# Patient Record
Sex: MALE | Race: WHITE | Employment: UNEMPLOYED | URBAN - METROPOLITAN AREA
[De-identification: names, ages, dates, MRNs, and addresses within clinical notes are randomized per-mention and may not be internally consistent; named-entity substitution may affect disease eponyms.]

---

## 2019-01-03 ENCOUNTER — HOSPITAL ENCOUNTER (OUTPATIENT)
Dept: PHYSICAL THERAPY | Age: 54
End: 2019-01-03

## 2019-01-10 ENCOUNTER — HOSPITAL ENCOUNTER (OUTPATIENT)
Dept: PHYSICAL THERAPY | Age: 54
End: 2019-01-10

## 2019-01-17 ENCOUNTER — HOSPITAL ENCOUNTER (OUTPATIENT)
Dept: PHYSICAL THERAPY | Age: 54
Discharge: HOME | End: 2019-01-17
Payer: MEDICAID

## 2019-01-17 PROCEDURE — 97110 THERAPEUTIC EXERCISES: CPT

## 2019-01-17 PROCEDURE — 97530 THERAPEUTIC ACTIVITIES: CPT

## 2019-01-17 PROCEDURE — 97163 PT EVAL HIGH COMPLEX 45 MIN: CPT

## 2019-01-17 NOTE — PROGRESS NOTES
[facility]IRA JOVITAVern [facility] 
[address] 
[phone] fax Plan of Care/ Statement of Necessity for Physical Therapy Services Patient name: Roderick Blake Start of Care: 2019 Referral source: Erin Covarrubias : 1965 Medical Diagnosis: Cervicalgia [M54.2] Low back pain [M54.5] Bilateral knee pain [M25.561, M25.562] Payor: [facility] MEDICAID / Plan: [facility] COMMUNITY PLAN [facility] / Product Type: Managed Care Medicaid /  Onset Date:18 Treatment Diagnosis: neck, back, knee pain Prior Hospitalization: see medical history Provider#: [id number] Medications: Verified on Patient summary List  
 Comorbidities: two neck fusions, lumbar fusion, bilateral ankle repairs after fractures, allergies, arthritis, tobacco use, COPD Prior Level of Function: Functionally independent, lives with girlfriend in multilevel home The Plan of Care and following information is based on the information from the initial evaluation. Assessment/ key information: Patient is a pleasant 47year old male who presents with widespread chronic pain following multiple surgeries to his neck, back and feet. He reports that in 2016 he fell and fractured both feet, undergoing surgery bilaterally and being non-weight bearing in a wheelchair for nearly a year, significantly decreasing strength. As he has recovered from these surgeries, he has noticed increased knee strain, Left more than Right. Additionally, he reports neck and back soreness due to fusions. Currently he is sleeping downstairs on a couch due to significant limitations with getting upstairs, and reports intermittent assistance from girlfriend for ADL and to prevent falls due to imbalance.  At evaluation Patient demonstrates the following lumbar AROM: flexion 50%, extension 25% of full, rotation Right 50% Left 75% of full, lateral flexion 75% of full bilaterally. Cervical AROM as follows: flexion 55 deg, extension 42 deg, rotation Right 38 deg Left 45 deg, lateral flexion Right 15 deg Left 25 deg. Overall extremity strength is 5/5 except Left knee 4+/5, and Patient demonstrates compensation and reports pain with resisted hip extension. Back pain with supine bridging. Full knee AROM, limited Left ankle mobility and sensation. Poor dynamic standing balance without UE support. Overall Patient is a good rehab candidate based on motivation, and will benefit from skilled physical therapy in order to address the above deficits. We will initiate in the aquatic setting in order to reduce joint and muscle strain. Evaluation Complexity History HIGH Complexity :3+ comorbidities / personal factors will impact the outcome/ POC ; Examination MEDIUM Complexity : 3 Standardized tests and measures addressing body structure, function, activity limitation and / or participation in recreation  ;Presentation LOW Complexity : Stable, uncomplicated  ;Clinical Decision Making MEDIUM Complexity : FOTO score of 26-74 Overall Complexity Rating: HIGH Problem List: pain affecting function, decrease ROM, decrease strength, edema affecting function, impaired gait/ balance, decrease ADL/ functional abilitiies, decrease activity tolerance, decrease flexibility/ joint mobility and decrease transfer abilities Treatment Plan may include any combination of the following: Therapeutic exercise, Therapeutic activities, Neuromuscular re-education, Physical agent/modality, Gait/balance training, Manual therapy, Aquatic therapy, Patient education, Self Care training, Functional mobility training, Home safety training and Stair training Patient / Family readiness to learn indicated by: asking questions, trying to perform skills and interest 
Persons(s) to be included in education: patient (P) Barriers to Learning/Limitations: None Patient Goal (s): less pain, mobility  
 Patient Self Reported Health Status: poor Rehabilitation Potential: fair Short Term Goals: To be accomplished in 1 weeks: 
Goal: Patient will be independent and compliant with HEP in order to improve muscle strength. Status at last note/certification: issued and reviewed Goal: Patient will initiate aquatic therapy without incident or increase in pain in order to progress toward long term goals. Status at last note/certification: n/a Long Term Goals: To be accomplished in 10 treatments: 
Goal: Patient will improve FOTO assessment score to 44 pts in order to indicate improved functional abilities. Status at last note/certification: 37 pts Goal: Patient will report little to no limitations with climbing one flight of stairs due to knee pain/imbalance in order to improve ease of mobility in the home. Status at last note/certification: limited a lot Goal: Patient will report little to no limitations with carrying light items due to pain/strain in the spine in order to improve ease of assisting girlfriend with household chores and errands. Status at last note/certification: limited a lot Goal: Patient will report ability to ambulate at least half a block with no more than a little limitation in order to improve independence with daily tasks. Status at last note/certification: ambulating no more than 400ft with cane Goal: Patient will improve Left knee strength to 5/5 in order to reduce buckling and improve stability with weight bearing. Status at last note/certification: 4+/5, occasional buckling Goal: Patient will report worst overall pain as 6/10 or less in order to improve quality of life. Status at last note/certification: 32/19 Frequency / Duration: Patient to be seen 2 times per week for 10 treatments. Patient/ Caregiver education and instruction: Diagnosis, prognosis, exercises 
 [x]  Plan of care has been reviewed with RAJANI Bonilla, PT 1/17/2019 8:55 AM 
 _____________________________________________________________________ I certify that the above Therapy Services are being furnished while the patient is under my care. I agree with the treatment plan and certify that this therapy is necessary. [de-identified] Signature:____________Date:_________TIME:________ 
 
[employer], Date and Time must be completed for valid certification ** Please sign and return to [facility] OMAIRA HILL [address] 
[phone] fax

## 2019-01-22 ENCOUNTER — HOSPITAL ENCOUNTER (OUTPATIENT)
Dept: PHYSICAL THERAPY | Age: 54
Discharge: HOME | End: 2019-01-22
Payer: MEDICAID

## 2019-01-22 PROCEDURE — 97113 AQUATIC THERAPY/EXERCISES: CPT

## 2019-01-24 ENCOUNTER — HOSPITAL ENCOUNTER (OUTPATIENT)
Dept: PHYSICAL THERAPY | Age: 54
Discharge: HOME | End: 2019-01-24
Payer: MEDICAID

## 2019-01-24 PROCEDURE — 97113 AQUATIC THERAPY/EXERCISES: CPT

## 2019-01-29 ENCOUNTER — HOSPITAL ENCOUNTER (OUTPATIENT)
Dept: PHYSICAL THERAPY | Age: 54
Discharge: HOME | End: 2019-01-29
Payer: MEDICAID

## 2019-01-29 PROCEDURE — 97113 AQUATIC THERAPY/EXERCISES: CPT

## 2019-01-31 ENCOUNTER — HOSPITAL ENCOUNTER (OUTPATIENT)
Dept: PHYSICAL THERAPY | Age: 54
Discharge: HOME | End: 2019-01-31
Payer: MEDICAID

## 2019-01-31 PROCEDURE — 97113 AQUATIC THERAPY/EXERCISES: CPT

## 2019-02-05 ENCOUNTER — HOSPITAL ENCOUNTER (OUTPATIENT)
Dept: PHYSICAL THERAPY | Age: 54
Discharge: HOME | End: 2019-02-05
Payer: MEDICAID

## 2019-02-05 PROCEDURE — 97113 AQUATIC THERAPY/EXERCISES: CPT

## 2019-02-07 ENCOUNTER — HOSPITAL ENCOUNTER (OUTPATIENT)
Dept: PHYSICAL THERAPY | Age: 54
Discharge: HOME | End: 2019-02-07
Payer: MEDICAID

## 2019-02-07 PROCEDURE — 97113 AQUATIC THERAPY/EXERCISES: CPT

## 2019-02-12 ENCOUNTER — HOSPITAL ENCOUNTER (OUTPATIENT)
Dept: PHYSICAL THERAPY | Age: 54
Discharge: HOME | End: 2019-02-12
Payer: MEDICAID

## 2019-02-12 PROCEDURE — 97113 AQUATIC THERAPY/EXERCISES: CPT

## 2019-02-12 NOTE — PROGRESS NOTES
[facility] OMAIRA JOVITAVern [facility] 
[address], [address] [place] [address] 
[phone] fax Progress Note Patient name: Teddy Schulz Start of Care: 2019 Referral source: Britton Hale* : 1965 Medical Diagnosis: Cervicalgia [M54.2] Low back pain [M54.5] Bilateral knee pain [M25.561, M25.562] Payor: [facility] MEDICAID / Plan: [facility] COMMUNITY PLAN [facility] / Product Type: Managed Care Medicaid /  Onset Date:18 Treatment Diagnosis: neck, back, knee pain Prior Hospitalization: see medical history Provider#: [id number] Medications: Verified on Patient summary List  
 Comorbidities: two neck fusions, lumbar fusion, bilateral ankle repairs after fractures, allergies, arthritis, tobacco use, COPD Prior Level of Function: Functionally independent, lives with girlfriend in [facility] home Visits from Start of Care: 8    Missed Visits: 0 Short Term Goals: To be accomplished in 1 weeks: 
Goal: Patient will be independent and compliant with HEP in order to improve muscle strength. Status at last note/certification: issued and reviewed Current status: met - Pt reports compliance with HEP Goal: Patient will initiate aquatic therapy without incident or increase in pain in order to progress toward long term goals. Status at last note/certification: n/a Current status: met Long Term Goals: To be accomplished in 10 treatments: 
Goal: Patient will improve FOTO assessment score to 44 pts in order to indicate improved functional abilities. Status at last note/certification: 37 pts Current status: not met - FOTO 31 pts (no decline in function noted) Goal: Patient will report little to no limitations with climbing one flight of stairs due to knee pain/imbalance in order to improve ease of mobility in the home. Status at last note/certification: limited a lot Current status: not met - limited a lot in performing; takes stairs sideways ascending and descending Goal: Patient will report little to no limitations with carrying light items due to pain/strain in the spine in order to improve ease of assisting girlfriend with household chores and errands. Status at last note/certification: limited a lot Current status: not met - still limited a lot in lifting and carrying light items Goal: Patient will report ability to ambulate at least half a block with no more than a little limitation in order to improve independence with daily tasks. Status at last note/certification: ambulating no more than 400ft with cane Current status: met - Pt able to walk 1/2 block with little limitation Goal: Patient will improve Left knee strength to 5/5 in order to reduce buckling and improve stability with weight bearing. Status at last note/certification: 4+/5, occasional buckling Current status: progressing - left knee Flex 5/5, knee ext 4+/5 Goal: Patient will report worst overall pain as 6/10 or less in order to improve quality of life. Status at last note/certification: 61/25 Current status: progressing - 7-8/10 pain at worst 
 
Key Functional Changes: Pt is progressing steadily with therapy measures. Pt reports increased flexibility and gains in LE strength. Balance continues to be Pt's main concern, affecting standing and amb tolerance. Pt continues to amb with SPC/walking stick. Pt would benefit from continued skilled PT to further improve LE strength and stability to prepare for trip to the [facility] in April 2019. Updated Goals: to be achieved in 5 weeks: 
Goal: Patient will improve FOTO assessment score to 44 pts in order to indicate improved functional abilities. Status at last note/certification: FOTO 31 pts (no decline in function noted) Current status:  
Goal: Patient will report little to no limitations with climbing one flight of stairs due to knee pain/imbalance in order to improve ease of mobility in the home. Status at last note/certification: limited a lot in performing; takes stairs sideways ascending and descending Current status:  
Goal: Patient will report little to no limitations with carrying light items due to pain/strain in the spine in order to improve ease of assisting girlfriend with household chores and errands. Status at last note/certification: still limited a lot in lifting and carrying light items Current status:  
Goal: Patient will report ability to ambulate at least one block with no more than a little limitation in order to improve independence with daily tasks. Status at last note/certification: Pt able to walk 1/2 block with little limitation Current status:  
Goal: Patient will improve Left knee strength to 5/5 in order to reduce buckling and improve stability with weight bearing. Status at last note/certification: left knee Flex 5/5, knee ext 4+/5 Current status:  
Goal: Patient will report worst overall pain as 6/10 or less in order to improve quality of life. Status at last note/certification: 2-1/35 pain at worst 
Current status:  
 
ASSESSMENT/RECOMMENDATIONS: 
[x]Continue therapy per initial plan/protocol at a frequency of  2 x per week for 5 weeks []Continue therapy with the following recommended changes:_____________________      _____________________________________________________________________ []Discontinue therapy progressing towards or have reached established goals []Discontinue therapy due to lack of appreciable progress towards goals []Discontinue therapy due to lack of attendance or compliance []Await Physician's recommendations/decisions regarding therapy []Other:________________________________________________________________ Thank you for this referral.  
Bronwyn Connors, PT 2/12/2019 9:18 AM 
NOTE TO PHYSICIAN:  PLEASE COMPLETE THE ORDERS BELOW AND FAX TO [facility]: [phone] If you are unable to process this request in 24 hours please contact our office: [phone] []  I have read the above report and request that my patient continue as recommended. []  I have read the above report and request that my patient continue therapy with the following changes/special instructions:________________________________________ []I have read the above report and request that my patient be discharged from therapy.  
 
[de-identified] Signature:____________Date:_________TIME:________ 
 
[employer], Date and Time must be completed for valid certification **

## 2019-02-14 ENCOUNTER — HOSPITAL ENCOUNTER (OUTPATIENT)
Dept: PHYSICAL THERAPY | Age: 54
Discharge: HOME | End: 2019-02-14
Payer: MEDICAID

## 2019-02-14 PROCEDURE — 97113 AQUATIC THERAPY/EXERCISES: CPT

## 2019-02-19 ENCOUNTER — HOSPITAL ENCOUNTER (OUTPATIENT)
Dept: PHYSICAL THERAPY | Age: 54
Discharge: HOME | End: 2019-02-19
Payer: MEDICAID

## 2019-02-19 PROCEDURE — 97113 AQUATIC THERAPY/EXERCISES: CPT

## 2019-02-21 ENCOUNTER — HOSPITAL ENCOUNTER (OUTPATIENT)
Dept: PHYSICAL THERAPY | Age: 54
Discharge: HOME | End: 2019-02-21
Payer: MEDICAID

## 2019-02-21 PROCEDURE — 97113 AQUATIC THERAPY/EXERCISES: CPT

## 2019-02-28 ENCOUNTER — HOSPITAL ENCOUNTER (OUTPATIENT)
Dept: PHYSICAL THERAPY | Age: 54
Discharge: HOME | End: 2019-02-28
Payer: MEDICAID

## 2019-02-28 PROCEDURE — 97113 AQUATIC THERAPY/EXERCISES: CPT

## 2019-03-05 ENCOUNTER — HOSPITAL ENCOUNTER (OUTPATIENT)
Dept: PHYSICAL THERAPY | Age: 54
Discharge: HOME | End: 2019-03-05
Payer: MEDICAID

## 2019-03-05 PROCEDURE — 97113 AQUATIC THERAPY/EXERCISES: CPT

## 2019-03-07 ENCOUNTER — HOSPITAL ENCOUNTER (OUTPATIENT)
Dept: PHYSICAL THERAPY | Age: 54
Discharge: HOME | End: 2019-03-07
Payer: MEDICAID

## 2019-03-07 PROCEDURE — 97113 AQUATIC THERAPY/EXERCISES: CPT

## 2019-03-12 ENCOUNTER — HOSPITAL ENCOUNTER (OUTPATIENT)
Dept: PHYSICAL THERAPY | Age: 54
Discharge: HOME | End: 2019-03-12
Payer: MEDICAID

## 2019-03-12 PROCEDURE — 97113 AQUATIC THERAPY/EXERCISES: CPT

## 2019-03-14 ENCOUNTER — HOSPITAL ENCOUNTER (OUTPATIENT)
Dept: PHYSICAL THERAPY | Age: 54
Discharge: HOME | End: 2019-03-14
Payer: MEDICAID

## 2019-03-14 PROCEDURE — 97113 AQUATIC THERAPY/EXERCISES: CPT

## 2019-03-14 NOTE — PROGRESS NOTES
[facility] OMAIRA HILL [facility], [address] [place] [address]  [phone] fax    Progress Note  Patient name: Elvi Jones Start of Care: 2019   Referral source: Mihai Mark* : 1965                Medical Diagnosis: Cervicalgia [M54.2]  Low back pain [M54.5]  Bilateral knee pain [M25.561, M25.562]  Payor: [facility] MEDICAID / Plan: [facility] COMMUNITY PLAN [employer] / Product Type: Managed Care Medicaid /  Onset Date:18                Treatment Diagnosis: neck, back, knee pain   Prior Hospitalization: see medical history Provider#: [id number]   Medications: Verified on Patient summary List    Comorbidities: two neck fusions, lumbar fusion, bilateral ankle repairs after fractures, allergies, arthritis, tobacco use, COPD   Prior Level of Function: Functionally independent, lives with girlfriend in [facility] home    Visits from Start of Care: 16    Missed Visits: 0    Goal: Patient will improve FOTO assessment score to 44 pts in order to indicate improved functional abilities. Status at last note/certification: FOTO 31 pts (no decline in function noted)  Current status: met - FOTO 45 pts  Goal: Patient will report little to no limitations with climbing one flight of stairs due to knee pain/imbalance in order to improve ease of mobility in the home. Status at last note/certification: limited a lot in performing; takes stairs sideways ascending and descending  Current status: not met - limited a lot in negotiating stairs due to pain; still negotiates sideways  Goal: Patient will report little to no limitations with carrying light items due to pain/strain in the spine in order to improve ease of assisting girlfriend with household chores and errands.   Status at last note/certification: still limited a lot in lifting and carrying light items  Current status: progressing - can lift and carry light items with little bit of difficulty  Goal: Patient will report ability to ambulate at least one block with no more than a little limitation in order to improve independence with daily tasks. Status at last note/certification: Pt able to walk 1/2 block with little limitation  Current status: progressing - able to walk at least one block but with difficulty  Goal: Patient will improve Left knee strength to 5/5 in order to reduce buckling and improve stability with weight bearing. Status at last note/certification: left knee Flex 5/5, knee ext 4+/5  Current status: met - 5/5 left knee flex/ext strength grossly  Goal: Patient will report worst overall pain as 6/10 or less in order to improve quality of life. Status at last note/certification: 8-2/21 pain at worst  Current status: not met - still reports 7-8/10 pain at worst    Key Functional Changes: Pt is steadily progressing with skilled therapy. Pt reports improved reaction times with standing and walking at home, resulting in no falls in the past three weeks. Pt exhibits improved left LE strength and is able to stand and walk a little longer at home and in the community. Pt continues to be challenged with amb prolonged distances and has increased difficulty negotiating stairs and is unable to lift or carry medium to heavy weighted objects. Pt would benefit from continued skilled PT to finish remaining visits and then transition to independent aquatic post rehab program for further gains in LE strength and balance to prepare for trip to the [facility] in April 2019. Updated Goals: to be achieved in 5 treatments:  Goal: Patient will report little to no limitations with climbing one flight of stairs due to knee pain/imbalance in order to improve ease of mobility in the home.   Status at last note/certification: still limited a lot negotiating stairs due to pain, takes sideways  Current status:   Goal: Patient will report little to no limitations with carrying light items due to pain/strain in the spine in order to improve ease of assisting girlfriend with household chores and errands. Status at last note/certification: able to lift and carry light items with little bit of difficulty  Current status:   Goal: Patient will report ability to ambulate at least one block with no more than a little limitation in order to improve independence with daily tasks. Status at last note/certification: able to walk at least one block with difficulty  Current status:   Goal: Patient will report worst overall pain as 6/10 or less in order to improve quality of life. Status at last note/certification: still 7-8/10 pain at worst  Current status:     ASSESSMENT/RECOMMENDATIONS:  [x]Continue therapy per initial plan/protocol at a frequency of  2 x per week for 5 treatments, then D/C  []Continue therapy with the following recommended changes:_____________________      _____________________________________________________________________  []Discontinue therapy progressing towards or have reached established goals  []Discontinue therapy due to lack of appreciable progress towards goals  []Discontinue therapy due to lack of attendance or compliance  []Await Physician's recommendations/decisions regarding therapy  []Other:________________________________________________________________    Thank you for this referral.   Yoon Connors, PT 3/14/2019 9:18 AM  NOTE TO PHYSICIAN:  PLEASE COMPLETE THE ORDERS BELOW AND   FAX TO [facility]: [phone]  If you are unable to process this request in 24 hours please contact our office: [phone]  []  I have read the above report and request that my patient continue as recommended. []  I have read the above report and request that my patient continue therapy with the following changes/special instructions:________________________________________  []I have read the above report and request that my patient be discharged from therapy.     [facility] Signature:____________Date:_________TIME:________    [facility], Date and Time must be completed for valid certification **

## 2019-03-19 ENCOUNTER — APPOINTMENT (OUTPATIENT)
Dept: PHYSICAL THERAPY | Age: 54
End: 2019-03-19
Payer: MEDICAID

## 2019-03-21 ENCOUNTER — HOSPITAL ENCOUNTER (OUTPATIENT)
Dept: PHYSICAL THERAPY | Age: 54
Discharge: HOME | End: 2019-03-21
Payer: MEDICAID

## 2019-03-21 PROCEDURE — 97113 AQUATIC THERAPY/EXERCISES: CPT

## 2019-03-26 ENCOUNTER — APPOINTMENT (OUTPATIENT)
Dept: PHYSICAL THERAPY | Age: 54
End: 2019-03-26
Payer: MEDICAID

## 2019-03-28 ENCOUNTER — APPOINTMENT (OUTPATIENT)
Dept: PHYSICAL THERAPY | Age: 54
End: 2019-03-28
Payer: MEDICAID

## 2019-04-02 ENCOUNTER — HOSPITAL ENCOUNTER (OUTPATIENT)
Dept: PHYSICAL THERAPY | Age: 54
Discharge: HOME | End: 2019-04-02
Payer: MEDICAID

## 2019-04-02 PROCEDURE — 97113 AQUATIC THERAPY/EXERCISES: CPT

## 2019-04-04 ENCOUNTER — HOSPITAL ENCOUNTER (OUTPATIENT)
Dept: PHYSICAL THERAPY | Age: 54
Discharge: HOME | End: 2019-04-04
Payer: MEDICAID

## 2019-04-04 PROCEDURE — 97113 AQUATIC THERAPY/EXERCISES: CPT

## 2019-04-09 ENCOUNTER — HOSPITAL ENCOUNTER (OUTPATIENT)
Dept: PHYSICAL THERAPY | Age: 54
Discharge: HOME | End: 2019-04-09
Payer: MEDICAID

## 2019-04-09 PROCEDURE — 97113 AQUATIC THERAPY/EXERCISES: CPT

## 2019-04-09 NOTE — PROGRESS NOTES
[facility] OMAIRA COLINDRES [facility] 
[address] [place], [address] [place] [address] 
[phone] fax Discharge Summary Patient name: Florentin Hairston Start of Care: 1/17/2019 Referral source: CarmencitaJeremy Bryceindu* TEN: 2/11/2201               
Medical Diagnosis: Cervicalgia [M54.2] Low back pain [M54.5] Bilateral knee pain [M25.561, M25.562] Payor: [facility] MEDICAID / Plan: [facility] COMMUNITY PLAN [facility] / Product Type: Managed Care Medicaid /  Onset Date:12/7/18               
Treatment Diagnosis: neck, back, knee pain Prior Hospitalization: see medical history Provider#: [id number] Medications: Verified on Patient summary List  
 Comorbidities: two neck fusions, lumbar fusion, bilateral ankle repairs after fractures, allergies, arthritis, tobacco use, COPD 
 Prior Level of Function: Functionally independent, lives with girlfriend in [facility] home Visits from Start of Care: 20    Missed Visits: 2 Reporting Period : 3/14/19 to 4/9/19 Goal: Patient will report little to no limitations with climbing one flight of stairs due to knee pain/imbalance in order to improve ease of mobility in the home. Status at last note/certification: still limited a lot negotiating stairs due to pain, takes sideways Current status: progressing - still limited a lot but takes stairs sideways Goal: Patient will report little to no limitations with carrying light items due to pain/strain in the spine in order to improve ease of assisting girlfriend with household chores and errands. Status at last note/certification: able to lift and carry light items with little bit of difficulty Current status: progressing - a little difficulty with carrying items Goal: Patient will report ability to ambulate at least one block with no more than a little limitation in order to improve independence with daily tasks. Status at last note/certification: able to walk at least one block with difficulty Current status: not met - Pt unable to walk one block consistently Goal: Patient will report worst overall pain as 6/10 or less in order to improve quality of life. Status at last note/certification: still 7-8/10 pain at worst 
Current status: progressing - 7/10 pain at worst 
 
Assessment/ Summary of Care: Pt made good progress with skilled therapy. Pt reports improved strength in LEs and standing balance, assisting with decreased falls since start of care. Pt is able to perform ADLs with less difficulty. Pt continues to report pain of 7/10 at worst but is able to manage better with exercise. Pt is independent with aquatics program and is ready to transition to independent management of care with post rehab program in the pool. Pt will be D/C at this time. Thank you for this referral. 
 
RECOMMENDATIONS: 
[x]Discontinue therapy: [x]Patient has reached or is progressing toward set goals []Patient is non-compliant or has abdicated 
    []Due to lack of appreciable progress towards set goals Bianca Connors, PT 4/9/2019 12:59 PM 
 
NOTE TO PHYSICIAN:  Please complete the following and fax to: [facility] [place] at [phone] Shiv Yancey Retain this original for your records. If you are unable to process this request in  
24 hours, please contact our office. [] I have read the above report and request that my patient continue therapy with the following changes/special instructions: 
[] I have read the above report and request that my patient be discharged from therapy [de-identified] Signature:____________Date:_________TIME:________ 
 
[employer], Date and Time must be completed for valid certification **

## 2021-08-14 PROBLEM — R00.0 TACHYCARDIA: Status: ACTIVE | Noted: 2021-08-14

## 2021-08-14 PROBLEM — R44.3 HALLUCINATIONS: Status: ACTIVE | Noted: 2021-08-14

## 2021-08-14 PROBLEM — N17.9 ACUTE RENAL FAILURE (ARF) (HCC): Status: ACTIVE | Noted: 2021-08-14

## 2022-02-05 PROBLEM — F20.9 SCHIZOPHRENIA (HCC): Status: ACTIVE | Noted: 2022-02-05

## 2022-02-05 PROBLEM — N17.9 AKI (ACUTE KIDNEY INJURY) (HCC): Status: ACTIVE | Noted: 2022-02-05

## 2022-02-05 PROBLEM — E86.0 DEHYDRATION: Status: ACTIVE | Noted: 2022-02-05

## 2022-03-18 PROBLEM — R00.0 TACHYCARDIA: Status: ACTIVE | Noted: 2021-08-14

## 2022-03-18 PROBLEM — R44.3 HALLUCINATIONS: Status: ACTIVE | Noted: 2021-08-14

## 2022-03-19 PROBLEM — E86.0 DEHYDRATION: Status: ACTIVE | Noted: 2022-02-05

## 2022-03-19 PROBLEM — N17.9 ACUTE RENAL FAILURE (ARF) (HCC): Status: ACTIVE | Noted: 2021-08-14

## 2022-03-19 PROBLEM — N17.9 AKI (ACUTE KIDNEY INJURY) (HCC): Status: ACTIVE | Noted: 2022-02-05

## 2022-03-19 PROBLEM — F20.9 SCHIZOPHRENIA (HCC): Status: ACTIVE | Noted: 2022-02-05

## 2023-05-18 RX ORDER — AMITRIPTYLINE HYDROCHLORIDE 50 MG/1
1 TABLET, FILM COATED ORAL NIGHTLY
COMMUNITY
Start: 2022-02-08

## 2023-05-18 RX ORDER — FLUTICASONE FUROATE, UMECLIDINIUM BROMIDE AND VILANTEROL TRIFENATATE 100; 62.5; 25 UG/1; UG/1; UG/1
1 POWDER RESPIRATORY (INHALATION) DAILY
COMMUNITY
Start: 2022-02-08

## 2023-05-18 RX ORDER — ATORVASTATIN CALCIUM 80 MG/1
80 TABLET, FILM COATED ORAL DAILY
COMMUNITY
Start: 2022-02-08

## 2023-05-18 RX ORDER — OLANZAPINE 10 MG/1
10 TABLET ORAL
COMMUNITY
Start: 2022-02-08

## 2023-05-18 RX ORDER — FLUTICASONE PROPIONATE 50 MCG
1 SPRAY, SUSPENSION (ML) NASAL DAILY
COMMUNITY
Start: 2022-02-08

## 2023-05-18 RX ORDER — ASPIRIN 81 MG/1
81 TABLET, CHEWABLE ORAL DAILY
COMMUNITY

## 2023-05-18 RX ORDER — OLANZAPINE 5 MG/1
5 TABLET ORAL
COMMUNITY
Start: 2022-02-08

## 2023-05-18 RX ORDER — PREGABALIN 150 MG/1
150 CAPSULE ORAL 2 TIMES DAILY
COMMUNITY
Start: 2022-02-08

## 2023-05-18 RX ORDER — MONTELUKAST SODIUM 10 MG/1
10 TABLET ORAL DAILY
COMMUNITY
Start: 2022-02-08

## 2023-05-18 RX ORDER — ESOMEPRAZOLE MAGNESIUM 40 MG/1
40 CAPSULE, DELAYED RELEASE ORAL DAILY
COMMUNITY
Start: 2022-02-08

## 2023-05-18 RX ORDER — HYDROXYZINE HYDROCHLORIDE 25 MG/1
2 TABLET, FILM COATED ORAL NIGHTLY
COMMUNITY
Start: 2022-02-08

## 2023-05-18 RX ORDER — OXYMORPHONE HYDROCHLORIDE 10 MG/1
10 TABLET ORAL 3 TIMES DAILY PRN
COMMUNITY

## 2023-05-18 RX ORDER — LAMOTRIGINE 25 MG/1
25 TABLET ORAL 2 TIMES DAILY
COMMUNITY
Start: 2022-02-08

## 2023-05-18 RX ORDER — ZOLPIDEM TARTRATE 10 MG/1
1 TABLET ORAL NIGHTLY
COMMUNITY
Start: 2021-07-29

## 2023-05-18 RX ORDER — ALBUTEROL SULFATE 90 UG/1
2 AEROSOL, METERED RESPIRATORY (INHALATION) EVERY 4 HOURS PRN
COMMUNITY
Start: 2022-02-08

## 2023-05-18 RX ORDER — TAMSULOSIN HYDROCHLORIDE 0.4 MG/1
0.4 CAPSULE ORAL
COMMUNITY
Start: 2022-02-08